# Patient Record
Sex: FEMALE | Race: WHITE | URBAN - METROPOLITAN AREA
[De-identification: names, ages, dates, MRNs, and addresses within clinical notes are randomized per-mention and may not be internally consistent; named-entity substitution may affect disease eponyms.]

---

## 2023-02-02 ENCOUNTER — OFFICE VISIT (OUTPATIENT)
Dept: URBAN - METROPOLITAN AREA CLINIC 51 | Facility: CLINIC | Age: 58
End: 2023-02-02
Payer: COMMERCIAL

## 2023-02-02 DIAGNOSIS — H00.12 CHALAZION RIGHT LOWER EYELID: Primary | ICD-10-CM

## 2023-02-02 PROCEDURE — 99204 OFFICE O/P NEW MOD 45 MIN: CPT | Performed by: STUDENT IN AN ORGANIZED HEALTH CARE EDUCATION/TRAINING PROGRAM

## 2023-02-02 PROCEDURE — 67800 REMOVE EYELID LESION: CPT | Performed by: STUDENT IN AN ORGANIZED HEALTH CARE EDUCATION/TRAINING PROGRAM

## 2023-02-02 PROCEDURE — 92285 EXTERNAL OCULAR PHOTOGRAPHY: CPT | Performed by: STUDENT IN AN ORGANIZED HEALTH CARE EDUCATION/TRAINING PROGRAM

## 2023-02-02 RX ORDER — NEOMYCIN SULFATE, POLYMYXIN B SULFATE AND DEXAMETHASONE 3.5; 10000; 1 MG/G; [USP'U]/G; MG/G
OINTMENT OPHTHALMIC
Qty: 3.5 | Refills: 0 | Status: ACTIVE
Start: 2023-02-02

## 2023-02-02 NOTE — IMPRESSION/PLAN
Impression: Chalazion right lower eyelid: H00.12. Plan: RLL. R/B/A of treatment reviewed. Risks include, but are not limited to, bleeding, infection, scarring, asymmetry, need for additional surgery. Also reviewed potential risks of kenalog injection including, but not limited to, blindness, skin atrophy, and skin depigmentation. Written informed consent obtained and all of patient's questions answered. Chalazion I&D- The lid was cleaned and topical anesthetic applied. Lidocaine with epinephrine was injected into the lid. A chalazion clamp was applied and the lid was everted. The lesion was incised parallel to the orientation of the meibomian glands, inflammatory tissues broken up and removed with a curette. Next, 0.1cc of Kenalog 10mg/ml was injected into the adjacent tarsus. Once the cavity was adequately debulked, antibiotic brayan was placed on the wound and the eyelid was returned to physiologic position. The patient tolerated the procedure well and there were no complications. Apply abx bid x 2 weeks. RTC oculoplastics prn.